# Patient Record
(demographics unavailable — no encounter records)

---

## 2024-12-30 NOTE — ASSESSMENT
[FreeTextEntry1] : s/p L knee open MPFL repair,  LBR, chondro (patella) on 11/17/23    - The patient was advised of the diagnosis.  The natural history of the pathology was explained to the patient in layman's terms.  Several different treatment options were discussed and explained including the risks and benefits of both surgical and non-surgical treatments.  All questions and concerns were answered. - We will continue conservative treatment with PT, icing, and anti-inflammatory medications. - The patient was advised to let pain guide the gradual advancement of activities. - cont PT on post op protocol - The patient was provided with a prescription for Physical Therapy  - Home exercises program learned at physical therapy. - naprosyn rx - Patient was given a prescription for an anti-inflammatory medication.  They will take it for the next week and then on an as needed basis, as long as there are no medical contra-indications.  Patient is counseled on possible GI, renal, and cardiovascular side effects. - The patient was advised to let pain guide the gradual advancement of activities.

## 2024-12-30 NOTE — IMAGING
[de-identified] : LEFT KNEE Inspection:  well healed surg scars, mild effusion  Palpation: anterior ttp Knee Range of Motion:  0-130 degrees Strength: 5-/5 Quadriceps strength, 5-/5 Hamstring strength, 5-/ 5 hip abductors Neurological: light touch is intact throughout Ligament Stability and Special Tests:  McMurrays: neg Lachman: neg Pivot Shift: neg Posterior Drawer: neg Valgus: neg Varus: neg Patella Apprehension: neg Patella Maltracking: pos

## 2024-12-30 NOTE — HISTORY OF PRESENT ILLNESS
[de-identified] : 18 year old male  ( student, [employer] )  left knee pain since 10/27/23 while at a party bumped into someone and knee buckled and gave out and felt it "dislocated" went to ER and has been using crtuches The pain is located  anterior, medial  The pain is associated with  swelling, poppin, buckling, catching, insstability  Worse with activity and better at rest. Has tried ice, activity mod, crutches, Advil  11/2/23 - asp knee, icing, had mri, still symptos  ***s/p L knee open MPFL repair,  LBR, chondro (patella) on 11/17/23***  11/20/23 - using crtuches and brace having some discomfrot in front of knee 1/8/24 - doing PT on protocol with Juan at [facility] in [place], making progress but some stiffness 3/4/24 - cont with PT and HEP, still weakness  4/15/24- doing HEP , was cut off from PT  6/10/24 - cont on protocol with [facility] and doing well  12/30/24- pain returns since sept 2024 and now pain ant and deep